# Patient Record
Sex: FEMALE | Race: WHITE | NOT HISPANIC OR LATINO | Employment: UNEMPLOYED | URBAN - NONMETROPOLITAN AREA
[De-identification: names, ages, dates, MRNs, and addresses within clinical notes are randomized per-mention and may not be internally consistent; named-entity substitution may affect disease eponyms.]

---

## 2023-06-29 ENCOUNTER — APPOINTMENT (OUTPATIENT)
Dept: PRIMARY CARE | Facility: CLINIC | Age: 63
End: 2023-06-29
Payer: COMMERCIAL

## 2023-07-06 ENCOUNTER — OFFICE VISIT (OUTPATIENT)
Dept: PRIMARY CARE | Facility: CLINIC | Age: 63
End: 2023-07-06
Payer: COMMERCIAL

## 2023-07-06 VITALS
WEIGHT: 127 LBS | DIASTOLIC BLOOD PRESSURE: 72 MMHG | HEIGHT: 64 IN | HEART RATE: 67 BPM | SYSTOLIC BLOOD PRESSURE: 108 MMHG | BODY MASS INDEX: 21.68 KG/M2 | OXYGEN SATURATION: 96 %

## 2023-07-06 DIAGNOSIS — Z00.00 WELL ADULT EXAM: Primary | ICD-10-CM

## 2023-07-06 DIAGNOSIS — R74.8 ABNORMAL LIVER ENZYMES: ICD-10-CM

## 2023-07-06 DIAGNOSIS — E78.00 PURE HYPERCHOLESTEROLEMIA: ICD-10-CM

## 2023-07-06 DIAGNOSIS — Z12.31 ENCOUNTER FOR SCREENING MAMMOGRAM FOR BREAST CANCER: ICD-10-CM

## 2023-07-06 DIAGNOSIS — Z12.12 SCREENING FOR COLORECTAL CANCER: ICD-10-CM

## 2023-07-06 DIAGNOSIS — Z12.11 SCREENING FOR COLORECTAL CANCER: ICD-10-CM

## 2023-07-06 DIAGNOSIS — F32.1 CURRENT MODERATE EPISODE OF MAJOR DEPRESSIVE DISORDER WITHOUT PRIOR EPISODE (MULTI): ICD-10-CM

## 2023-07-06 PROBLEM — R07.9 CHEST PAIN: Status: RESOLVED | Noted: 2023-07-06 | Resolved: 2023-07-06

## 2023-07-06 PROBLEM — M54.9 BACK PAIN: Status: RESOLVED | Noted: 2023-07-06 | Resolved: 2023-07-06

## 2023-07-06 PROBLEM — U07.1 COVID-19: Status: RESOLVED | Noted: 2023-07-06 | Resolved: 2023-07-06

## 2023-07-06 PROBLEM — S32.000A COMPRESSION FRACTURE OF LUMBAR VERTEBRA (MULTI): Status: ACTIVE | Noted: 2023-07-06

## 2023-07-06 PROBLEM — F32.9 MAJOR DEPRESSION, SINGLE EPISODE: Status: ACTIVE | Noted: 2023-07-06

## 2023-07-06 PROBLEM — A60.04 HERPES, VULVAR: Status: ACTIVE | Noted: 2023-07-06

## 2023-07-06 PROBLEM — D24.9 INTRADUCTAL PAPILLOMA OF BREAST: Status: ACTIVE | Noted: 2023-07-06

## 2023-07-06 PROBLEM — R63.5 WEIGHT GAIN: Status: RESOLVED | Noted: 2023-07-06 | Resolved: 2023-07-06

## 2023-07-06 PROBLEM — N63.20 UNSPECIFIED LUMP IN THE LEFT BREAST, UNSPECIFIED QUADRANT: Status: RESOLVED | Noted: 2023-07-06 | Resolved: 2023-07-06

## 2023-07-06 PROBLEM — R53.83 FATIGUE: Status: RESOLVED | Noted: 2023-07-06 | Resolved: 2023-07-06

## 2023-07-06 PROBLEM — S33.5XXA LUMBAR SPRAIN: Status: RESOLVED | Noted: 2023-07-06 | Resolved: 2023-07-06

## 2023-07-06 PROBLEM — J01.80 OTHER ACUTE SINUSITIS: Status: RESOLVED | Noted: 2023-07-06 | Resolved: 2023-07-06

## 2023-07-06 PROBLEM — J30.9 ALLERGIC RHINITIS: Status: ACTIVE | Noted: 2023-07-06

## 2023-07-06 LAB
ALANINE AMINOTRANSFERASE (SGPT) (U/L) IN SER/PLAS: 17 U/L (ref 7–45)
ALBUMIN (G/DL) IN SER/PLAS: 4.5 G/DL (ref 3.4–5)
ALKALINE PHOSPHATASE (U/L) IN SER/PLAS: 58 U/L (ref 33–136)
ANION GAP IN SER/PLAS: 13 MMOL/L (ref 10–20)
ASPARTATE AMINOTRANSFERASE (SGOT) (U/L) IN SER/PLAS: 19 U/L (ref 9–39)
BILIRUBIN TOTAL (MG/DL) IN SER/PLAS: 1.2 MG/DL (ref 0–1.2)
CALCIUM (MG/DL) IN SER/PLAS: 9.3 MG/DL (ref 8.6–10.3)
CARBON DIOXIDE, TOTAL (MMOL/L) IN SER/PLAS: 27 MMOL/L (ref 21–32)
CHLORIDE (MMOL/L) IN SER/PLAS: 103 MMOL/L (ref 98–107)
CHOLESTEROL (MG/DL) IN SER/PLAS: 204 MG/DL (ref 0–199)
CHOLESTEROL IN HDL (MG/DL) IN SER/PLAS: 61.3 MG/DL
CHOLESTEROL/HDL RATIO: 3.3
CREATININE (MG/DL) IN SER/PLAS: 0.91 MG/DL (ref 0.5–1.05)
ERYTHROCYTE DISTRIBUTION WIDTH (RATIO) BY AUTOMATED COUNT: 12.4 % (ref 11.5–14.5)
ERYTHROCYTE MEAN CORPUSCULAR HEMOGLOBIN CONCENTRATION (G/DL) BY AUTOMATED: 33.5 G/DL (ref 32–36)
ERYTHROCYTE MEAN CORPUSCULAR VOLUME (FL) BY AUTOMATED COUNT: 94 FL (ref 80–100)
ERYTHROCYTES (10*6/UL) IN BLOOD BY AUTOMATED COUNT: 4.77 X10E12/L (ref 4–5.2)
GFR FEMALE: 70 ML/MIN/1.73M2
GLUCOSE (MG/DL) IN SER/PLAS: 92 MG/DL (ref 74–99)
HEMATOCRIT (%) IN BLOOD BY AUTOMATED COUNT: 44.8 % (ref 36–46)
HEMOGLOBIN (G/DL) IN BLOOD: 15 G/DL (ref 12–16)
LDL: 128 MG/DL (ref 0–99)
LEUKOCYTES (10*3/UL) IN BLOOD BY AUTOMATED COUNT: 5.6 X10E9/L (ref 4.4–11.3)
PLATELETS (10*3/UL) IN BLOOD AUTOMATED COUNT: 248 X10E9/L (ref 150–450)
POTASSIUM (MMOL/L) IN SER/PLAS: 4.8 MMOL/L (ref 3.5–5.3)
PROTEIN TOTAL: 6.6 G/DL (ref 6.4–8.2)
SODIUM (MMOL/L) IN SER/PLAS: 138 MMOL/L (ref 136–145)
THYROTROPIN (MIU/L) IN SER/PLAS BY DETECTION LIMIT <= 0.05 MIU/L: 1.31 MIU/L (ref 0.44–3.98)
TRIGLYCERIDE (MG/DL) IN SER/PLAS: 75 MG/DL (ref 0–149)
UREA NITROGEN (MG/DL) IN SER/PLAS: 15 MG/DL (ref 6–23)
VLDL: 15 MG/DL (ref 0–40)

## 2023-07-06 PROCEDURE — 85027 COMPLETE CBC AUTOMATED: CPT

## 2023-07-06 PROCEDURE — 1036F TOBACCO NON-USER: CPT | Performed by: FAMILY MEDICINE

## 2023-07-06 PROCEDURE — 80053 COMPREHEN METABOLIC PANEL: CPT

## 2023-07-06 PROCEDURE — 80061 LIPID PANEL: CPT

## 2023-07-06 PROCEDURE — 84443 ASSAY THYROID STIM HORMONE: CPT

## 2023-07-06 PROCEDURE — 99396 PREV VISIT EST AGE 40-64: CPT | Performed by: FAMILY MEDICINE

## 2023-07-06 RX ORDER — CALCIUM CARBONATE/VITAMIN D3 600MG-5MCG
1 TABLET ORAL 2 TIMES DAILY
COMMUNITY
Start: 2012-02-18

## 2023-07-06 RX ORDER — FLUTICASONE PROPIONATE 50 MCG
1 SPRAY, SUSPENSION (ML) NASAL
COMMUNITY
Start: 2012-02-18

## 2023-07-06 RX ORDER — MULTIVITAMIN
1 TABLET ORAL
COMMUNITY
Start: 2012-02-18

## 2023-07-06 RX ORDER — BUPROPION HYDROCHLORIDE 150 MG/1
150 TABLET ORAL EVERY MORNING
Qty: 30 TABLET | Refills: 5 | Status: SHIPPED | OUTPATIENT
Start: 2023-07-06 | End: 2024-01-02

## 2023-07-06 RX ORDER — IBUPROFEN 200 MG
TABLET ORAL
COMMUNITY

## 2023-07-06 ASSESSMENT — PATIENT HEALTH QUESTIONNAIRE - PHQ9
10. IF YOU CHECKED OFF ANY PROBLEMS, HOW DIFFICULT HAVE THESE PROBLEMS MADE IT FOR YOU TO DO YOUR WORK, TAKE CARE OF THINGS AT HOME, OR GET ALONG WITH OTHER PEOPLE: SOMEWHAT DIFFICULT
SUM OF ALL RESPONSES TO PHQ9 QUESTIONS 1 AND 2: 2
1. LITTLE INTEREST OR PLEASURE IN DOING THINGS: SEVERAL DAYS
2. FEELING DOWN, DEPRESSED OR HOPELESS: SEVERAL DAYS

## 2023-07-06 NOTE — PROGRESS NOTES
Subjective   Patient ID: Cassandra Fraga is a 62 y.o. female who presents for Fatigue (Pt has been very tired, pt thinks her cholesterol levels may be high, needs orders for colonoscopy and mammogram ).  HPI  Under a lot of stress  Issues with marriage  Has no confidence  Has a lot of anxiety  Scared that  will take everything    Ophtho-  Dentist-  Colonoscopy- ordered  EMILY-  FOBT-  UA/Micro-  Mammo- ordered  DEXA-  PAP-  Lung CT-  Coronary Calcium CT Score-  AAA-  EKG-  Pneumovax-  Prevnar-  Flu-  Shingrix-  Td-  Hep C-  Advance Directives-      Current Outpatient Medications:     calcium carbonate-vitamin D3 600 mg-5 mcg (200 unit) tablet, Take 1 tablet by mouth twice a day., Disp: , Rfl:     fluticasone (Flonase) 50 mcg/actuation nasal spray, 1 spray by Does not apply route once daily., Disp: , Rfl:     multivitamin tablet, Take 1 tablet by mouth once daily., Disp: , Rfl:     buPROPion XL (Wellbutrin XL) 150 mg 24 hr tablet, Take 1 tablet (150 mg) by mouth once daily in the morning. Do not crush, chew, or split., Disp: 30 tablet, Rfl: 5    ibuprofen 200 mg tablet, Take by mouth., Disp: , Rfl:    Past Surgical History:   Procedure Laterality Date    BREAST LUMPECTOMY  2013    Right Breast Lumpectomy     SECTION, CLASSIC  2013     Section    COLONOSCOPY  2014    Complete Colonoscopy    OTHER SURGICAL HISTORY  2013    Division Of Plantar Fascia And Muscle Left Foot    OTHER SURGICAL HISTORY  2013    Division Of Plantar Fascia And Muscle Right Foot    OTHER SURGICAL HISTORY  2021    Breast biopsy excisional    RHINOPLASTY  2013    Rhinoplasty      Past Medical History:   Diagnosis Date    Chest pain 2023    Diarrhea, unspecified 2016    Acute diarrhea    Fatigue 2023    Laceration without foreign body of left thumb without damage to nail, initial encounter 2016    Laceration of left thumb, initial encounter    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    Other acute sinusitis 07/06/2023    Other injury of unspecified body region, initial encounter 06/16/2016    Puncture wound    Personal history of other diseases of the respiratory system 04/14/2014    Personal history of acute sinusitis    Personal history of other diseases of the respiratory system 08/06/2014    Personal history of acute sinusitis    Personal history of other diseases of the respiratory system 05/30/2017    History of acute sinusitis    Personal history of other diseases of the respiratory system 06/18/2015    History of acute sinusitis    Unspecified lump in the left breast, unspecified quadrant 07/06/2023     Social History     Tobacco Use    Smoking status: Never    Smokeless tobacco: Never      No family history on file.   Review of Systems    Objective   /72   Pulse 67   Ht 1.613 m (5' 3.5\")   Wt 57.6 kg (127 lb)   SpO2 96%   BMI 22.14 kg/m²    Physical Exam  Vitals and nursing note reviewed.   Constitutional:       General: She is not in acute distress.     Appearance: Normal appearance.   HENT:      Head: Normocephalic and atraumatic.      Right Ear: Tympanic membrane, ear canal and external ear normal.      Left Ear: Tympanic membrane, ear canal and external ear normal.      Nose: Nose normal.      Mouth/Throat:      Mouth: Mucous membranes are moist.      Pharynx: Oropharynx is clear.   Eyes:      Extraocular Movements: Extraocular movements intact.      Pupils: Pupils are equal, round, and reactive to light.   Neck:      Vascular: No carotid bruit.   Cardiovascular:      Rate and Rhythm: Normal rate and regular rhythm.      Pulses: Normal pulses.      Heart sounds: Normal heart sounds. No murmur heard.  Pulmonary:      Effort: Pulmonary effort is normal.      Breath sounds: Normal breath sounds.   Abdominal:      General: Abdomen is flat. Bowel sounds are normal.      Palpations: Abdomen is soft. There is no mass.   Musculoskeletal:      " Cervical back: Normal range of motion and neck supple.   Lymphadenopathy:      Cervical: No cervical adenopathy.   Skin:     Capillary Refill: Capillary refill takes less than 2 seconds.   Neurological:      General: No focal deficit present.      Mental Status: She is alert and oriented to person, place, and time.   Psychiatric:         Mood and Affect: Mood is anxious.         Behavior: Behavior normal.         Assessment/Plan   Problem List Items Addressed This Visit       Abnormal liver enzymes    Relevant Orders    Comprehensive Metabolic Panel (Completed)    CBC (Completed)    TSH with reflex to Free T4 if abnormal (Completed)    Major depression, single episode    Relevant Medications    buPROPion XL (Wellbutrin XL) 150 mg 24 hr tablet    Pure hypercholesterolemia    Relevant Orders    Lipid Panel (Completed)    Comprehensive Metabolic Panel (Completed)    CBC (Completed)    TSH with reflex to Free T4 if abnormal (Completed)     Other Visit Diagnoses       Well adult exam    -  Primary    Encounter for screening mammogram for breast cancer        Relevant Orders    BI mammo bilateral screening tomosynthesis    Screening for colorectal cancer        Relevant Orders    Colonoscopy            Patient understands and agrees with treatment plan    Bandar Woodard, DO

## 2024-03-06 ENCOUNTER — OFFICE VISIT (OUTPATIENT)
Dept: PRIMARY CARE | Facility: CLINIC | Age: 64
End: 2024-03-06
Payer: COMMERCIAL

## 2024-03-06 VITALS
HEART RATE: 66 BPM | BODY MASS INDEX: 22.46 KG/M2 | SYSTOLIC BLOOD PRESSURE: 118 MMHG | OXYGEN SATURATION: 98 % | WEIGHT: 128.8 LBS | DIASTOLIC BLOOD PRESSURE: 70 MMHG

## 2024-03-06 DIAGNOSIS — M75.82 BONE SPUR OF LEFT ACROMIOCLAVICULAR JOINT: Primary | ICD-10-CM

## 2024-03-06 PROCEDURE — 1036F TOBACCO NON-USER: CPT | Performed by: FAMILY MEDICINE

## 2024-03-06 PROCEDURE — 99213 OFFICE O/P EST LOW 20 MIN: CPT | Performed by: FAMILY MEDICINE

## 2024-03-06 NOTE — PROGRESS NOTES
Subjective   Patient ID: Cassandra Fraga is a 63 y.o. female who presents for Mass (L shouder).  HPI  Left shoulder mass that thinks is getting bigger  No pain  No numbness, weakness  No redness  No fever, chills      Current Outpatient Medications:     calcium carbonate-vitamin D3 600 mg-5 mcg (200 unit) tablet, Take 1 tablet by mouth twice a day., Disp: , Rfl:     fluticasone (Flonase) 50 mcg/actuation nasal spray, 1 spray by Does not apply route once daily., Disp: , Rfl:     ibuprofen 200 mg tablet, Take by mouth., Disp: , Rfl:     multivitamin tablet, Take 1 tablet by mouth once daily., Disp: , Rfl:     buPROPion XL (Wellbutrin XL) 150 mg 24 hr tablet, Take 1 tablet (150 mg) by mouth once daily in the morning. Do not crush, chew, or split., Disp: 30 tablet, Rfl: 5   Past Surgical History:   Procedure Laterality Date    BREAST LUMPECTOMY  2013    Right Breast Lumpectomy     SECTION, CLASSIC  2013     Section    COLONOSCOPY  2014    Complete Colonoscopy    OTHER SURGICAL HISTORY  2013    Division Of Plantar Fascia And Muscle Left Foot    OTHER SURGICAL HISTORY  2013    Division Of Plantar Fascia And Muscle Right Foot    OTHER SURGICAL HISTORY  2021    Breast biopsy excisional    RHINOPLASTY  2013    Rhinoplasty      Past Medical History:   Diagnosis Date    Chest pain 2023    Diarrhea, unspecified 2016    Acute diarrhea    Fatigue 2023    Laceration without foreign body of left thumb without damage to nail, initial encounter 2016    Laceration of left thumb, initial encounter    Neoplasm of uncertain behavior 2012    Other acute sinusitis 2023    Other injury of unspecified body region, initial encounter 2016    Puncture wound    Personal history of other diseases of the respiratory system 2014    Personal history of acute sinusitis    Personal history of other diseases of the respiratory system 2014     Personal history of acute sinusitis    Personal history of other diseases of the respiratory system 05/30/2017    History of acute sinusitis    Personal history of other diseases of the respiratory system 06/18/2015    History of acute sinusitis    Unspecified lump in the left breast, unspecified quadrant 07/06/2023     Social History     Tobacco Use    Smoking status: Never    Smokeless tobacco: Never      No family history on file.   Review of Systems    Objective   /70   Pulse 66   Wt 58.4 kg (128 lb 12.8 oz)   SpO2 98%   BMI 22.46 kg/m²    Physical Exam  Vitals and nursing note reviewed.   Constitutional:       Appearance: Normal appearance.   Cardiovascular:      Rate and Rhythm: Normal rate and regular rhythm.      Pulses: Normal pulses.      Heart sounds: Normal heart sounds.   Pulmonary:      Effort: Pulmonary effort is normal.      Breath sounds: Normal breath sounds.   Musculoskeletal:      Comments: Prominence on cranial portion of left AC joint- bony feeling with ? Small cyst   Skin:     Capillary Refill: Capillary refill takes less than 2 seconds.   Neurological:      General: No focal deficit present.      Mental Status: She is alert and oriented to person, place, and time.   Psychiatric:         Mood and Affect: Mood normal.         Behavior: Behavior normal.         Assessment/Plan   Problem List Items Addressed This Visit    None  Visit Diagnoses       Bone spur of left acromioclavicular joint    -  Primary    Relevant Orders    XR clavicle left        X-ray  Heat, THEA  Suspect OA of AC joint    Patient understands and agrees with treatment plan    Bandar Woodard, DO

## 2024-03-19 ENCOUNTER — HOSPITAL ENCOUNTER (OUTPATIENT)
Dept: RADIOLOGY | Facility: HOSPITAL | Age: 64
Discharge: HOME | End: 2024-03-19
Payer: COMMERCIAL

## 2024-03-19 DIAGNOSIS — M75.82 BONE SPUR OF LEFT ACROMIOCLAVICULAR JOINT: ICD-10-CM

## 2024-03-19 PROCEDURE — 73000 X-RAY EXAM OF COLLAR BONE: CPT | Mod: LT

## 2024-03-19 PROCEDURE — 73000 X-RAY EXAM OF COLLAR BONE: CPT | Mod: LEFT SIDE | Performed by: RADIOLOGY

## 2024-11-15 ENCOUNTER — OFFICE VISIT (OUTPATIENT)
Dept: PRIMARY CARE | Facility: CLINIC | Age: 64
End: 2024-11-15
Payer: MEDICARE

## 2024-11-15 VITALS
HEIGHT: 63 IN | BODY MASS INDEX: 22.5 KG/M2 | HEART RATE: 58 BPM | DIASTOLIC BLOOD PRESSURE: 62 MMHG | OXYGEN SATURATION: 98 % | WEIGHT: 127 LBS | SYSTOLIC BLOOD PRESSURE: 124 MMHG

## 2024-11-15 DIAGNOSIS — Z78.0 MENOPAUSE: ICD-10-CM

## 2024-11-15 DIAGNOSIS — Z12.31 ENCOUNTER FOR SCREENING MAMMOGRAM FOR BREAST CANCER: ICD-10-CM

## 2024-11-15 DIAGNOSIS — S32.000D COMPRESSION FRACTURE OF LUMBAR VERTEBRA WITH ROUTINE HEALING, UNSPECIFIED LUMBAR VERTEBRAL LEVEL, SUBSEQUENT ENCOUNTER: ICD-10-CM

## 2024-11-15 DIAGNOSIS — Z12.12 SCREENING FOR COLORECTAL CANCER: ICD-10-CM

## 2024-11-15 DIAGNOSIS — M65.311 TRIGGER THUMB OF RIGHT HAND: Primary | ICD-10-CM

## 2024-11-15 DIAGNOSIS — Z12.11 SCREENING FOR COLORECTAL CANCER: ICD-10-CM

## 2024-11-15 DIAGNOSIS — D22.9 MULTIPLE NEVI: ICD-10-CM

## 2024-11-15 DIAGNOSIS — F32.5 MAJOR DEPRESSIVE DISORDER WITH SINGLE EPISODE, IN FULL REMISSION (CMS-HCC): ICD-10-CM

## 2024-11-15 PROCEDURE — 1158F ADVNC CARE PLAN TLK DOCD: CPT | Performed by: FAMILY MEDICINE

## 2024-11-15 PROCEDURE — 99214 OFFICE O/P EST MOD 30 MIN: CPT | Performed by: FAMILY MEDICINE

## 2024-11-15 PROCEDURE — 20550 NJX 1 TENDON SHEATH/LIGAMENT: CPT | Performed by: FAMILY MEDICINE

## 2024-11-15 PROCEDURE — 1123F ACP DISCUSS/DSCN MKR DOCD: CPT | Performed by: FAMILY MEDICINE

## 2024-11-15 PROCEDURE — 3008F BODY MASS INDEX DOCD: CPT | Performed by: FAMILY MEDICINE

## 2024-11-15 PROCEDURE — 1159F MED LIST DOCD IN RCRD: CPT | Performed by: FAMILY MEDICINE

## 2024-11-15 PROCEDURE — 1036F TOBACCO NON-USER: CPT | Performed by: FAMILY MEDICINE

## 2024-11-15 PROCEDURE — 90677 PCV20 VACCINE IM: CPT | Performed by: FAMILY MEDICINE

## 2024-11-15 PROCEDURE — 1160F RVW MEDS BY RX/DR IN RCRD: CPT | Performed by: FAMILY MEDICINE

## 2024-11-15 PROCEDURE — G0009 ADMIN PNEUMOCOCCAL VACCINE: HCPCS | Performed by: FAMILY MEDICINE

## 2024-11-15 RX ORDER — TRIAMCINOLONE ACETONIDE 40 MG/ML
20 INJECTION, SUSPENSION INTRA-ARTICULAR; INTRAMUSCULAR
Status: COMPLETED | OUTPATIENT
Start: 2024-11-15 | End: 2024-11-15

## 2024-11-15 RX ORDER — LIDOCAINE HYDROCHLORIDE 10 MG/ML
0.5 INJECTION, SOLUTION INFILTRATION; PERINEURAL
Status: COMPLETED | OUTPATIENT
Start: 2024-11-15 | End: 2024-11-15

## 2024-11-15 ASSESSMENT — ENCOUNTER SYMPTOMS
DEPRESSION: 0
LOSS OF SENSATION IN FEET: 0
OCCASIONAL FEELINGS OF UNSTEADINESS: 0

## 2024-11-15 NOTE — PROGRESS NOTES
Subjective   Patient ID: Cassandra Fraga is a 64 y.o. female who presents for thumb (Having right trigger thumb /).  HPI  Left thumb has been not wanting to bend since 10/7/24  Painful- will get stick flexed  Slight swelling  No numbness, weakness  No bruising  No redness    No CP, SOB, palpitations, dizziness, HA, vision changes      Current Outpatient Medications:     buPROPion XL (Wellbutrin XL) 150 mg 24 hr tablet, Take 1 tablet (150 mg) by mouth once daily in the morning. Do not crush, chew, or split., Disp: 30 tablet, Rfl: 5    calcium carbonate-vitamin D3 600 mg-5 mcg (200 unit) tablet, Take 1 tablet by mouth twice a day., Disp: , Rfl:     fluticasone (Flonase) 50 mcg/actuation nasal spray, 1 spray by Does not apply route once daily., Disp: , Rfl:     ibuprofen 200 mg tablet, Take by mouth., Disp: , Rfl:     multivitamin tablet, Take 1 tablet by mouth once daily., Disp: , Rfl:    Past Surgical History:   Procedure Laterality Date    BREAST LUMPECTOMY  2013    Right Breast Lumpectomy     SECTION, CLASSIC  2013     Section    COLONOSCOPY  2014    Complete Colonoscopy    OTHER SURGICAL HISTORY  2013    Division Of Plantar Fascia And Muscle Left Foot    OTHER SURGICAL HISTORY  2013    Division Of Plantar Fascia And Muscle Right Foot    OTHER SURGICAL HISTORY  2021    Breast biopsy excisional    RHINOPLASTY  2013    Rhinoplasty      Past Medical History:   Diagnosis Date    Chest pain 2023    Diarrhea, unspecified 2016    Acute diarrhea    Fatigue 2023    Laceration without foreign body of left thumb without damage to nail, initial encounter 2016    Laceration of left thumb, initial encounter    Neoplasm of uncertain behavior 2012    Other acute sinusitis 2023    Other injury of unspecified body region, initial encounter 2016    Puncture wound    Personal history of other diseases of the respiratory system  "04/14/2014    Personal history of acute sinusitis    Personal history of other diseases of the respiratory system 08/06/2014    Personal history of acute sinusitis    Personal history of other diseases of the respiratory system 05/30/2017    History of acute sinusitis    Personal history of other diseases of the respiratory system 06/18/2015    History of acute sinusitis    Unspecified lump in the left breast, unspecified quadrant 07/06/2023     Social History     Tobacco Use    Smoking status: Never    Smokeless tobacco: Never   Substance Use Topics    Alcohol use: Not Currently    Drug use: Never      No family history on file.   Review of Systems    Objective   /62   Pulse 58   Ht 1.6 m (5' 3\")   Wt 57.6 kg (127 lb)   SpO2 98%   BMI 22.50 kg/m²    Physical Exam  Constitutional:       General: She is not in acute distress.     Appearance: Normal appearance. She is not ill-appearing.   HENT:      Head: Normocephalic and atraumatic.   Cardiovascular:      Rate and Rhythm: Normal rate and regular rhythm.      Pulses: Normal pulses.      Heart sounds: Normal heart sounds. No murmur heard.  Pulmonary:      Effort: Pulmonary effort is normal.      Breath sounds: Normal breath sounds. No wheezing, rhonchi or rales.   Musculoskeletal:      Comments: Right trigger finger with pain at base of thumb on plantar side   Skin:     Capillary Refill: Capillary refill takes less than 2 seconds.   Neurological:      General: No focal deficit present.      Mental Status: She is alert and oriented to person, place, and time.   Psychiatric:         Mood and Affect: Mood normal.         Behavior: Behavior normal.           Injection tendon or ligament: R thumb A1 for trigger finger on 11/15/2024 5:44 PM  Indications: pain and tendon swelling  Details: 25 G needle, volar approach  Medications: 20 mg triamcinolone acetonide 40 mg/mL; 0.5 mL lidocaine 10 mg/mL (1 %)  Outcome: tolerated well, no immediate " complications  Procedure, treatment alternatives, risks and benefits explained, specific risks discussed. Consent was given by the patient. Immediately prior to procedure a time out was called to verify the correct patient, procedure, equipment, support staff and site/side marked as required. Patient was prepped and draped in the usual sterile fashion.             Assessment/Plan   Problem List Items Addressed This Visit       Major depressive disorder with single episode, in full remission (CMS-HCC)    Compression fracture of lumbar vertebra (Multi)     Other Visit Diagnoses       Trigger thumb of right hand    -  Primary    Screening for colorectal cancer        Relevant Orders    Colonoscopy Screening; Average Risk Patient    Encounter for screening mammogram for breast cancer        Relevant Orders    BI mammo bilateral screening tomosynthesis    Menopause        Relevant Orders    XR DEXA bone density    Multiple nevi        Relevant Orders    Referral to Dermatology        Trigger Thumb- cortisone shot, ice    MDD- wellbutrin, CV exercise    Compression Fracture- healed- heat    Multiple Nevi- to dermatology    Patient understands and agrees with treatment plan    Bandar Woodard, DO

## 2024-12-03 ENCOUNTER — HOSPITAL ENCOUNTER (OUTPATIENT)
Dept: RADIOLOGY | Facility: CLINIC | Age: 64
Discharge: HOME | End: 2024-12-03
Payer: MEDICARE

## 2024-12-03 DIAGNOSIS — Z78.0 MENOPAUSE: ICD-10-CM

## 2024-12-03 PROCEDURE — 77080 DXA BONE DENSITY AXIAL: CPT | Performed by: RADIOLOGY

## 2024-12-03 PROCEDURE — 77080 DXA BONE DENSITY AXIAL: CPT

## 2024-12-04 DIAGNOSIS — M85.89 OSTEOPENIA OF MULTIPLE SITES: Primary | ICD-10-CM

## 2024-12-04 RX ORDER — IBANDRONATE SODIUM 150 MG/1
150 TABLET, FILM COATED ORAL
Qty: 1 TABLET | Refills: 11 | Status: SHIPPED | OUTPATIENT
Start: 2024-12-04 | End: 2025-12-04

## 2025-01-03 ENCOUNTER — HOSPITAL ENCOUNTER (OUTPATIENT)
Dept: RADIOLOGY | Facility: CLINIC | Age: 65
Discharge: HOME | End: 2025-01-03
Payer: MEDICARE

## 2025-01-03 VITALS — BODY MASS INDEX: 22.5 KG/M2 | WEIGHT: 126.98 LBS | HEIGHT: 63 IN

## 2025-01-03 DIAGNOSIS — Z12.31 ENCOUNTER FOR SCREENING MAMMOGRAM FOR BREAST CANCER: ICD-10-CM

## 2025-01-03 PROCEDURE — 77063 BREAST TOMOSYNTHESIS BI: CPT

## 2025-01-27 ENCOUNTER — TELEPHONE (OUTPATIENT)
Dept: PRIMARY CARE | Facility: CLINIC | Age: 65
End: 2025-01-27
Payer: COMMERCIAL

## 2025-01-27 DIAGNOSIS — J01.80 ACUTE NON-RECURRENT SINUSITIS OF OTHER SINUS: Primary | ICD-10-CM

## 2025-01-27 RX ORDER — AMOXICILLIN 875 MG/1
875 TABLET, FILM COATED ORAL 2 TIMES DAILY
Qty: 20 TABLET | Refills: 0 | Status: SHIPPED | OUTPATIENT
Start: 2025-01-27 | End: 2025-01-27 | Stop reason: ALTCHOICE

## 2025-01-27 RX ORDER — AMOXICILLIN AND CLAVULANATE POTASSIUM 875; 125 MG/1; MG/1
875 TABLET, FILM COATED ORAL 2 TIMES DAILY
Qty: 20 TABLET | Refills: 0 | Status: SHIPPED | OUTPATIENT
Start: 2025-01-27 | End: 2025-02-06

## 2025-01-27 NOTE — TELEPHONE ENCOUNTER
Long hx of sinus infection.  Sinus stuffed up headache.  Could she do a phone visit for this>  swatilorraine uses [facility] in [place].  [phone]

## 2025-04-22 ENCOUNTER — TELEPHONE (OUTPATIENT)
Dept: PRIMARY CARE | Facility: CLINIC | Age: 65
End: 2025-04-22
Payer: COMMERCIAL

## 2025-04-23 DIAGNOSIS — A60.04 HERPES, VULVAR: Primary | ICD-10-CM

## 2025-04-23 RX ORDER — VALACYCLOVIR HYDROCHLORIDE 1 G/1
2000 TABLET, FILM COATED ORAL 2 TIMES DAILY
Qty: 12 TABLET | Refills: 0 | Status: SHIPPED | OUTPATIENT
Start: 2025-04-23 | End: 2025-04-26

## 2025-06-04 ENCOUNTER — APPOINTMENT (OUTPATIENT)
Dept: PRIMARY CARE | Facility: CLINIC | Age: 65
End: 2025-06-04
Payer: COMMERCIAL

## 2025-06-04 VITALS
HEIGHT: 62 IN | WEIGHT: 130 LBS | OXYGEN SATURATION: 98 % | DIASTOLIC BLOOD PRESSURE: 60 MMHG | HEART RATE: 76 BPM | SYSTOLIC BLOOD PRESSURE: 126 MMHG | BODY MASS INDEX: 23.92 KG/M2

## 2025-06-04 DIAGNOSIS — Z13.6 SCREENING FOR CARDIOVASCULAR CONDITION: ICD-10-CM

## 2025-06-04 DIAGNOSIS — F32.5 MAJOR DEPRESSIVE DISORDER WITH SINGLE EPISODE, IN FULL REMISSION: ICD-10-CM

## 2025-06-04 DIAGNOSIS — Z00.00 ROUTINE GENERAL MEDICAL EXAMINATION AT HEALTH CARE FACILITY: Primary | ICD-10-CM

## 2025-06-04 DIAGNOSIS — E78.00 PURE HYPERCHOLESTEROLEMIA: ICD-10-CM

## 2025-06-04 DIAGNOSIS — D24.9 INTRADUCTAL PAPILLOMA OF BREAST, UNSPECIFIED LATERALITY: ICD-10-CM

## 2025-06-04 DIAGNOSIS — Z71.89 CARDIAC RISK COUNSELING: ICD-10-CM

## 2025-06-04 DIAGNOSIS — S32.000D COMPRESSION FRACTURE OF LUMBAR VERTEBRA WITH ROUTINE HEALING, UNSPECIFIED LUMBAR VERTEBRAL LEVEL, SUBSEQUENT ENCOUNTER: ICD-10-CM

## 2025-06-04 RX ORDER — SERTRALINE HYDROCHLORIDE 50 MG/1
50 TABLET, FILM COATED ORAL DAILY
Qty: 30 TABLET | Refills: 5 | Status: SHIPPED | OUTPATIENT
Start: 2025-06-04 | End: 2025-12-01

## 2025-06-04 RX ORDER — BUPROPION HYDROCHLORIDE 150 MG/1
150 TABLET ORAL EVERY MORNING
Qty: 30 TABLET | Refills: 5 | Status: SHIPPED | OUTPATIENT
Start: 2025-06-04 | End: 2025-12-01

## 2025-06-04 ASSESSMENT — PATIENT HEALTH QUESTIONNAIRE - PHQ9
2. FEELING DOWN, DEPRESSED OR HOPELESS: NOT AT ALL
1. LITTLE INTEREST OR PLEASURE IN DOING THINGS: NOT AT ALL
SUM OF ALL RESPONSES TO PHQ9 QUESTIONS 1 AND 2: 0

## 2025-06-04 ASSESSMENT — ENCOUNTER SYMPTOMS
ARTHRALGIAS: 0
WHEEZING: 0
BRUISES/BLEEDS EASILY: 0
DYSURIA: 0
EYE REDNESS: 0
HEMATURIA: 0
NERVOUS/ANXIOUS: 1
FREQUENCY: 0
DIZZINESS: 0
CONSTIPATION: 0
CHILLS: 0
OCCASIONAL FEELINGS OF UNSTEADINESS: 0
BLOOD IN STOOL: 0
HEADACHES: 0
EYE PAIN: 0
NAUSEA: 0
COUGH: 0
NUMBNESS: 0
POLYDIPSIA: 0
VOMITING: 0
WEAKNESS: 0
SHORTNESS OF BREATH: 1
POLYPHAGIA: 0
FEVER: 0
ADENOPATHY: 0
COLOR CHANGE: 0
CHEST TIGHTNESS: 0
TROUBLE SWALLOWING: 0
ABDOMINAL PAIN: 0
TREMORS: 0
FATIGUE: 0
BACK PAIN: 0
DEPRESSION: 0
PALPITATIONS: 0
SORE THROAT: 0
LOSS OF SENSATION IN FEET: 0
DYSPHORIC MOOD: 1
DIARRHEA: 0

## 2025-06-04 ASSESSMENT — ACTIVITIES OF DAILY LIVING (ADL)
BATHING: INDEPENDENT
GROCERY_SHOPPING: INDEPENDENT
DRESSING: INDEPENDENT
TAKING_MEDICATION: INDEPENDENT
MANAGING_FINANCES: INDEPENDENT
DOING_HOUSEWORK: INDEPENDENT

## 2025-06-04 NOTE — ASSESSMENT & PLAN NOTE
Orders:    sertraline (Zoloft) 50 mg tablet; Take 1 tablet (50 mg) by mouth once daily.    buPROPion XL (Wellbutrin XL) 150 mg 24 hr tablet; Take 1 tablet (150 mg) by mouth once daily in the morning. Do not crush, chew, or split.

## 2025-06-04 NOTE — PROGRESS NOTES
Subjective   Reason for Visit: Cassandra Fraga is an 64 y.o. female here for a Medicare Wellness visit.     Past Medical, Surgical, and Family History reviewed and updated in chart.    Reviewed all medications by prescribing practitioner or clinical pharmacist (such as prescriptions, OTCs, herbal therapies and supplements) and documented in the medical record.    HPI  History of Present Illness  The patient presents for evaluation of shortness of breath, depression, and cataract.    She experiences shortness of breath during stair climbing, which raises concerns about potential cardiac issues. She has a family history of heart disease. Her paternal grandfather had a pacemaker, and all of his sisters had bypass surgeries. Her father had high blood pressure. She does not have high blood pressure. She reports occasional chest tightness in the evenings, but this symptom has not been present recently. She has gained 10 pounds over the past 6 months and is considering Ozempic as a potential treatment option. She has been doing yard work and trying to keep up with her exercise routine. She is up to date on her mammograms and colonoscopy. She recently had a colonoscopy, which showed no abnormalities. However, she experienced dizziness for 2 weeks following the procedure and fell twice. She is considering asking the medical team about the medications used during the procedure to avoid future issues.    She has been taking Wellbutrin intermittently, which she finds beneficial for her mood and motivation. However, she reports that it affects her memory and causes insomnia, necessitating the use of Benadryl. She also experiences teeth clenching as a side effect of Wellbutrin. She has discontinued daily use of Wellbutrin due to these side effects but resumes it when she feels particularly sad or unmotivated. She has 5 tablets left. She is considering adding Zoloft to her regimen to improve her sleep quality. She has tried  Celexa in the past but did not find it effective. She reports no numbness or weakness but notes a decrease in motivation and increased fatigue. She has been under significant stress due to her children's issues and their well-being. She is still  but does not want to be. She has a lot of fear of her  and making changes or doing anything really sometimes. She is afraid to take the last final step because she worries about her kids losing everything. She would have to see an . She is not a person who cries or weeps, but after her mother , she was really surprised by how sad she was and still is a little bit. She started taking Wellbutrin again, which helps her avoid sudden crying or having really sad days. She did not usually have this problem before; it was more about lack of motivation and some depression from her marriage, which is now out of the picture. Now, she has a lot of peace and shea.    She recently got new glasses because her eyes changed drastically, and the prescription went way down. She has a cataract in one eye and has an appointment with an ophthalmologist in 10/2025.    She has an appointment with a dentist in 2025, but she has not been for 5 years.    FAMILY HISTORY  Her paternal grandfather had a pacemaker, and all of his sisters had bypass surgeries. Her father had high blood pressure. Her maternal grandfather had angina. Her mother had episodes of depression.       Ophtho-   Dentist-appt   Colonoscopy- 3/25- 10 years  EMILY-  FOBT-  UA/Micro-  Mammo- 25  DEXA-   PAP-  Lung CT-  Coronary Calcium CT Score-  AAA-  EKG-  RSV-   Prevnar - 11/15/24  Flu- 10/24  Shingrix-  Td-   Hep C-  Advance Directives-  AWV- 25  CV risk- 25    Patient Care Team:  Bandar Woodard DO as PCP - General  Bandar Woodard DO as PCP - [facility] Attributed Provider     Review of Systems   Constitutional:  Negative for chills, fatigue and fever.   HENT:  Negative  "for congestion, ear discharge, ear pain, hearing loss, nosebleeds, sore throat, tinnitus and trouble swallowing.    Eyes:  Negative for pain, redness and visual disturbance.   Respiratory:  Positive for shortness of breath. Negative for cough, chest tightness and wheezing.    Cardiovascular:  Negative for chest pain, palpitations and leg swelling.   Gastrointestinal:  Negative for abdominal pain, blood in stool, constipation, diarrhea, nausea and vomiting.   Endocrine: Negative for cold intolerance, heat intolerance, polydipsia, polyphagia and polyuria.   Genitourinary:  Negative for dysuria, frequency, hematuria and urgency.   Musculoskeletal:  Negative for arthralgias, back pain and gait problem.   Skin:  Negative for color change and rash.   Neurological:  Negative for dizziness, tremors, syncope, weakness, numbness and headaches.   Hematological:  Negative for adenopathy. Does not bruise/bleed easily.   Psychiatric/Behavioral:  Positive for dysphoric mood. The patient is nervous/anxious.        Objective   Vitals:  /60   Pulse 76   Ht 1.575 m (5' 2\")   Wt 59 kg (130 lb)   SpO2 98%   BMI 23.78 kg/m²       Physical Exam  Vitals and nursing note reviewed.   Constitutional:       General: She is not in acute distress.     Appearance: Normal appearance.   HENT:      Head: Normocephalic and atraumatic.      Right Ear: Tympanic membrane, ear canal and external ear normal.      Left Ear: Tympanic membrane, ear canal and external ear normal.      Nose: Nose normal.      Mouth/Throat:      Mouth: Mucous membranes are moist.      Pharynx: Oropharynx is clear.   Eyes:      Extraocular Movements: Extraocular movements intact.      Pupils: Pupils are equal, round, and reactive to light.   Neck:      Vascular: No carotid bruit.   Cardiovascular:      Rate and Rhythm: Normal rate and regular rhythm.      Pulses: Normal pulses.      Heart sounds: Normal heart sounds. No murmur heard.  Pulmonary:      Effort: Pulmonary " effort is normal.      Breath sounds: Normal breath sounds.   Abdominal:      General: Abdomen is flat. Bowel sounds are normal.      Palpations: Abdomen is soft. There is no mass.   Musculoskeletal:      Cervical back: Normal range of motion and neck supple.   Lymphadenopathy:      Cervical: No cervical adenopathy.   Skin:     Capillary Refill: Capillary refill takes less than 2 seconds.   Neurological:      General: No focal deficit present.      Mental Status: She is alert and oriented to person, place, and time.   Psychiatric:         Mood and Affect: Mood is anxious.         Behavior: Behavior normal.         Assessment & Plan  Routine general medical examination at health care facility         Screening for cardiovascular condition    Orders:    ECG 12 Lead    CT cardiac scoring wo IV contrast; Future    Pure hypercholesterolemia    Orders:    TSH with reflex to Free T4 if abnormal    Comprehensive Metabolic Panel    Lipid Panel    CBC    Major depressive disorder with single episode, in full remission    Orders:    sertraline (Zoloft) 50 mg tablet; Take 1 tablet (50 mg) by mouth once daily.    buPROPion XL (Wellbutrin XL) 150 mg 24 hr tablet; Take 1 tablet (150 mg) by mouth once daily in the morning. Do not crush, chew, or split.    Intraductal papilloma of breast, unspecified laterality         Compression fracture of lumbar vertebra with routine healing, unspecified lumbar vertebral level, subsequent encounter         Cardiac risk counseling          Renewed/continued rest of medications  Checked  labs  Updated Health Maintenance in HPI section  MDD- add zoloft 50 mg to wellbutrin    Breast CA- follow up with surgeon    Vertebral fracture- calcium/vitamin D    Hyperlipidemia- continue meds, low fat/cholesterol diet        Cardiac Risk Assessment  5 minutes were spent discussing Cardiovascular risk (5.1%) and, if needed, lifestyle modifications were recommended, including nutritional choices, exercise, and  elimination of habits contributing to risk.   Aspirin use/disuse was discussed following the guidelines below:  low dose ASA ( mg) should be considered:    If prior Heart Attack/Stroke/Peripheral vascular disease:  Generally recommend daily low dose aspirin unless extremely high bleeding risk (e.g., gastrointestinal).    If no prior Heart Attack/Stroke/Peripheral vascular disease:              Age over 70: Do not use Aspirin for prevention    Age less than 70 and 10-year cardiovascular disease risk is >20%: use low dose Aspirin for prevention.

## 2025-06-04 NOTE — ASSESSMENT & PLAN NOTE
Orders:    TSH with reflex to Free T4 if abnormal    Comprehensive Metabolic Panel    Lipid Panel    CBC

## 2025-06-05 LAB
ALBUMIN SERPL-MCNC: 4.5 G/DL (ref 3.6–5.1)
ALP SERPL-CCNC: 57 U/L (ref 37–153)
ALT SERPL-CCNC: 15 U/L (ref 6–29)
ANION GAP SERPL CALCULATED.4IONS-SCNC: 10 MMOL/L (CALC) (ref 7–17)
AST SERPL-CCNC: 17 U/L (ref 10–35)
BILIRUB SERPL-MCNC: 0.8 MG/DL (ref 0.2–1.2)
BUN SERPL-MCNC: 13 MG/DL (ref 7–25)
CALCIUM SERPL-MCNC: 9.4 MG/DL (ref 8.6–10.4)
CHLORIDE SERPL-SCNC: 104 MMOL/L (ref 98–110)
CHOLEST SERPL-MCNC: 207 MG/DL
CHOLEST/HDLC SERPL: 3.6 (CALC)
CO2 SERPL-SCNC: 26 MMOL/L (ref 20–32)
CREAT SERPL-MCNC: 0.81 MG/DL (ref 0.5–1.05)
EGFRCR SERPLBLD CKD-EPI 2021: 81 ML/MIN/1.73M2
ERYTHROCYTE [DISTWIDTH] IN BLOOD BY AUTOMATED COUNT: 13.4 % (ref 11–15)
GLUCOSE SERPL-MCNC: 76 MG/DL (ref 65–99)
HCT VFR BLD AUTO: 44.1 % (ref 35–45)
HDLC SERPL-MCNC: 57 MG/DL
HGB BLD-MCNC: 14.4 G/DL (ref 11.7–15.5)
LDLC SERPL CALC-MCNC: 114 MG/DL (CALC)
MCH RBC QN AUTO: 32.7 PG (ref 27–33)
MCHC RBC AUTO-ENTMCNC: 32.7 G/DL (ref 32–36)
MCV RBC AUTO: 100.2 FL (ref 80–100)
NONHDLC SERPL-MCNC: 150 MG/DL (CALC)
PLATELET # BLD AUTO: 242 THOUSAND/UL (ref 140–400)
PMV BLD REES-ECKER: 10.6 FL (ref 7.5–12.5)
POTASSIUM SERPL-SCNC: 4.3 MMOL/L (ref 3.5–5.3)
PROT SERPL-MCNC: 6.4 G/DL (ref 6.1–8.1)
RBC # BLD AUTO: 4.4 MILLION/UL (ref 3.8–5.1)
SODIUM SERPL-SCNC: 140 MMOL/L (ref 135–146)
TRIGL SERPL-MCNC: 252 MG/DL
TSH SERPL-ACNC: 0.9 MIU/L (ref 0.4–4.5)
WBC # BLD AUTO: 7 THOUSAND/UL (ref 3.8–10.8)

## 2025-06-09 ENCOUNTER — TELEPHONE (OUTPATIENT)
Dept: PRIMARY CARE | Facility: CLINIC | Age: 65
End: 2025-06-09
Payer: COMMERCIAL

## 2025-06-09 DIAGNOSIS — A60.04 HERPES, VULVAR: Primary | ICD-10-CM

## 2025-06-09 RX ORDER — VALACYCLOVIR HYDROCHLORIDE 1 G/1
2000 TABLET, FILM COATED ORAL 2 TIMES DAILY
Qty: 12 TABLET | Refills: 0 | Status: SHIPPED | OUTPATIENT
Start: 2025-06-09 | End: 2025-06-12

## 2025-06-18 ENCOUNTER — HOSPITAL ENCOUNTER (OUTPATIENT)
Dept: RADIOLOGY | Facility: CLINIC | Age: 65
Discharge: HOME | End: 2025-06-18
Payer: MEDICARE

## 2025-06-18 DIAGNOSIS — Z13.6 SCREENING FOR CARDIOVASCULAR CONDITION: ICD-10-CM

## 2025-06-18 PROCEDURE — 75571 CT HRT W/O DYE W/CA TEST: CPT

## 2025-06-20 ENCOUNTER — TELEMEDICINE (OUTPATIENT)
Dept: PRIMARY CARE | Facility: CLINIC | Age: 65
End: 2025-06-20
Payer: MEDICARE

## 2025-06-20 DIAGNOSIS — E78.2 MIXED HYPERLIPIDEMIA: Primary | ICD-10-CM

## 2025-06-20 PROCEDURE — 1159F MED LIST DOCD IN RCRD: CPT | Performed by: FAMILY MEDICINE

## 2025-06-20 PROCEDURE — 99213 OFFICE O/P EST LOW 20 MIN: CPT | Performed by: FAMILY MEDICINE

## 2025-06-20 PROCEDURE — 1160F RVW MEDS BY RX/DR IN RCRD: CPT | Performed by: FAMILY MEDICINE

## 2025-06-20 PROCEDURE — G2211 COMPLEX E/M VISIT ADD ON: HCPCS | Performed by: FAMILY MEDICINE

## 2025-06-20 PROCEDURE — 1123F ACP DISCUSS/DSCN MKR DOCD: CPT | Performed by: FAMILY MEDICINE

## 2025-06-20 RX ORDER — ROSUVASTATIN CALCIUM 10 MG/1
10 TABLET, COATED ORAL DAILY
Qty: 100 TABLET | Refills: 3 | Status: SHIPPED | OUTPATIENT
Start: 2025-06-20 | End: 2026-07-25

## 2025-06-21 NOTE — PROGRESS NOTES
"Subjective   Patient ID: Cassandra Fraga is a 64 y.o. female who presents for CT result review.  HPI  No CP, SOB, palpitations  Father had CAD    Current Medications[1]   Surgical History[2]   Medical History[3]  Social History[4]   Family History[5]   Review of Systems    Objective   There were no vitals taken for this visit.   Physical Exam      Assessment/Plan   Problem List Items Addressed This Visit    None  Visit Diagnoses         Mixed hyperlipidemia    -  Primary    Relevant Medications    rosuvastatin (Crestor) 10 mg tablet        Reviewed CT results with her  Discussed risk and alternatives  Will limit fatty foods and increase CV exercise    I discussed with the patient the potential benefits and risks of the use of telephone or video-conferencing that differ from in-person services (e.g., limits to patient confidentiality, limitations on the provider’s ability to observe the patient, limitations on the diagnostic tools available). I explained to the patient that I may determine at any point that telehealth services are not appropriate based on the patient’s circumstances and either party may therefore end the service to schedule an alternative in-person service or contact 911 to address a medical emergency. With the understanding of these risks, benefits and alternatives, the patient agreed to use the telephone or video-conferencing platform selected for this virtual session and further the patient confirmed his/her understanding that the services do not guarantee a specific outcome or recovery.  \"Spent 5 minutes with patient on phone discussing health concerns.\"    Virtual or Telephone Consent    While technically available, the patient was unable or unwilling to consent to connect via audio/video telehealth technology; therefore, I performed this visit using a real-time audio only connection between Cassandra Fraga & Bandar Woodard DO.  Verbal consent was requested and obtained from Cassandra SALGADO" [place] on this date, 25 for a telehealth visit and the patient's location was confirmed at the time of the visit.     Patient understands and agrees with treatment plan        Bandar Woodard DO        [1]   Current Outpatient Medications:     calcium carbonate-vitamin D3 600 mg-5 mcg (200 unit) tablet, Take 1 tablet by mouth twice a day., Disp: , Rfl:     fluticasone (Flonase) 50 mcg/actuation nasal spray, 1 spray by Does not apply route once daily., Disp: , Rfl:     ibuprofen 200 mg tablet, Take by mouth., Disp: , Rfl:     multivitamin tablet, Take 1 tablet by mouth once daily., Disp: , Rfl:     rosuvastatin (Crestor) 10 mg tablet, Take 1 tablet (10 mg) by mouth once daily., Disp: 100 tablet, Rfl: 3  [2]   Past Surgical History:  Procedure Laterality Date    BREAST LUMPECTOMY  2013    Right Breast Lumpectomy     SECTION, CLASSIC  2013     Section    COLONOSCOPY  2014    Complete Colonoscopy    OTHER SURGICAL HISTORY  2013    Division Of Plantar Fascia And Muscle Left Foot    OTHER SURGICAL HISTORY  2013    Division Of Plantar Fascia And Muscle Right Foot    OTHER SURGICAL HISTORY  2021    Breast biopsy excisional    RHINOPLASTY  2013    Rhinoplasty   [3]   Past Medical History:  Diagnosis Date    Chest pain 2023    Diarrhea, unspecified 2016    Acute diarrhea    Fatigue 2023    Laceration without foreign body of left thumb without damage to nail, initial encounter 2016    Laceration of left thumb, initial encounter    Neoplasm of uncertain behavior 2012    Other acute sinusitis 2023    Other injury of unspecified body region, initial encounter 2016    Puncture wound    Personal history of other diseases of the respiratory system 2014    Personal history of acute sinusitis    Personal history of other diseases of the respiratory system 2014    Personal history of acute sinusitis    Personal  history of other diseases of the respiratory system 05/30/2017    History of acute sinusitis    Personal history of other diseases of the respiratory system 06/18/2015    History of acute sinusitis    Unspecified lump in the left breast, unspecified quadrant 07/06/2023   [4]   Social History  Tobacco Use    Smoking status: Never    Smokeless tobacco: Never   Substance Use Topics    Alcohol use: Not Currently    Drug use: Never   [5] No family history on file.

## 2025-06-30 ENCOUNTER — TELEPHONE (OUTPATIENT)
Dept: PRIMARY CARE | Facility: CLINIC | Age: 65
End: 2025-06-30
Payer: MEDICARE

## 2025-06-30 NOTE — TELEPHONE ENCOUNTER
Just started statin and her throat has been changing feeling like theres something there or laryngitis and is uncomfortable and read that it could be a side effect and wants to stop taking it. She wants to know what to do she's scared about the side effects causing permanent damage

## 2025-06-30 NOTE — TELEPHONE ENCOUNTER
Stop it and see if it goes away in a few days- if does then will change the medicine. If it doesn't, then not from the medicine

## 2025-07-03 ENCOUNTER — TELEPHONE (OUTPATIENT)
Dept: PRIMARY CARE | Facility: CLINIC | Age: 65
End: 2025-07-03
Payer: MEDICARE

## 2025-07-03 DIAGNOSIS — J04.0 LARYNGITIS: Primary | ICD-10-CM

## 2025-07-03 RX ORDER — METHYLPREDNISOLONE 4 MG/1
TABLET ORAL
Qty: 21 TABLET | Refills: 0 | Status: SHIPPED | OUTPATIENT
Start: 2025-07-03 | End: 2025-07-09

## 2025-07-07 ENCOUNTER — TELEPHONE (OUTPATIENT)
Dept: PRIMARY CARE | Facility: CLINIC | Age: 65
End: 2025-07-07
Payer: MEDICARE

## 2025-07-07 DIAGNOSIS — J04.0 LARYNGITIS: Primary | ICD-10-CM

## 2025-07-07 RX ORDER — AMOXICILLIN 875 MG/1
875 TABLET, COATED ORAL 2 TIMES DAILY
Qty: 20 TABLET | Refills: 0 | Status: SHIPPED | OUTPATIENT
Start: 2025-07-07 | End: 2025-07-17

## 2025-07-07 NOTE — TELEPHONE ENCOUNTER
She is on a steroid for her sinus thing.  She has a harsh throat with green stuff in her head.  Should she be on and antibiotic morphine allergy uses [facility] in [place]  [phone]

## 2025-07-23 ENCOUNTER — OFFICE VISIT (OUTPATIENT)
Dept: PRIMARY CARE | Facility: CLINIC | Age: 65
End: 2025-07-23
Payer: MEDICARE

## 2025-07-23 VITALS
BODY MASS INDEX: 23.55 KG/M2 | HEART RATE: 60 BPM | SYSTOLIC BLOOD PRESSURE: 124 MMHG | WEIGHT: 128 LBS | HEIGHT: 62 IN | DIASTOLIC BLOOD PRESSURE: 62 MMHG | OXYGEN SATURATION: 98 %

## 2025-07-23 DIAGNOSIS — M77.12 LATERAL EPICONDYLITIS OF LEFT ELBOW: ICD-10-CM

## 2025-07-23 DIAGNOSIS — R49.0 HOARSENESS OF VOICE: Primary | ICD-10-CM

## 2025-07-23 PROCEDURE — 99214 OFFICE O/P EST MOD 30 MIN: CPT | Performed by: FAMILY MEDICINE

## 2025-07-23 PROCEDURE — 1160F RVW MEDS BY RX/DR IN RCRD: CPT | Performed by: FAMILY MEDICINE

## 2025-07-23 PROCEDURE — 3008F BODY MASS INDEX DOCD: CPT | Performed by: FAMILY MEDICINE

## 2025-07-23 PROCEDURE — 1159F MED LIST DOCD IN RCRD: CPT | Performed by: FAMILY MEDICINE

## 2025-07-23 RX ORDER — METHYLPREDNISOLONE 4 MG/1
TABLET ORAL
Qty: 21 TABLET | Refills: 0 | Status: SHIPPED | OUTPATIENT
Start: 2025-07-23 | End: 2025-07-29

## 2025-07-23 NOTE — PROGRESS NOTES
Subjective   Patient ID: Cassandra Fraga is a 64 y.o. female who presents for Sore Throat and throat (Having hoariness in her voice /Some burning in her throat  ).  HPI  History of Present Illness  The patient presents for evaluation of hoarseness, tennis elbow, and acid reflux.    She experienced severe hoarseness, initially suspecting laryngitis, a condition she has never had before. This was accompanied by head congestion, leading her to believe she might have pneumonia. She also reports mild wheezing and coughing at night, with a small amount of red sputum. She has a history of smoking for 9 years in her youth but quit 36 years ago. She is concerned about potential serious conditions such as ALS or lung cancer. She has a family history of sensitivity to medications. She always has a little bit of postnasal drip going on. She has been tested for allergies, but the results were negative. She discontinued Flonase due to concerns about throat irritation and hoarseness. She took antibiotics and steroids, which seemed to alleviate the hoarseness, but it has since returned intermittently. She plans to schedule an appointment with Allergy for her sinus infection issue. She used to get up to 5 sinus infections a year when she was living in [place]. When she was tested, she was really low in that antibody. She took Benadryl last night, which helped her sleep.    After two weeks of statin use, she woke up one night with severe pain in her hands. A few weeks ago, she developed what she believes to be tennis elbow, which has progressively worsened. She also reports general muscle weakness. Despite avoiding repetitive activities like weed whacking, she continues to experience pain. Last night, she woke up with severe pain in her right hand. She took ibuprofen last night for random hand pain. She took two ibuprofen before bed last night but woke up an hour later with severe hand pain. She took Benadryl and was able to  "sleep. She has reduced her caffeine intake and exercises regularly, including swimming and jogging at least three times a week. She had trigger finger last year, which caused shooting pain up her arms and into her hands.    She has acid reflux and took Nexium last week and Prevacid, which she found effective. She is cautious about overuse of antacids due to potential side effects. She takes them as needed when she feels acid reflux. 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 It was so bad she could only eat between 10 and 2. They did an open MRI and it showed possible multiple myeloma, but a closed MRI revealed a hemangioma in her spine. She started taking Nexium, which she found very helpful.    Social History:  Hobbies: Swimming, jogging  Tobacco: Smoked cigarettes for 9 years, quit 36 years ago  Coffee/Tea/Caffeine-containing Drinks: Reduced to three-quarters decaf  Sleep: Takes Benadryl to aid sleep when in pain    PAST SURGICAL HISTORY:  - Trigger finger surgery last year  - Open MRI showing possible multiple myeloma, followed by closed MRI revealing hemangioma in spine    FAMILY HISTORY  The patient mentions that her family is sensitive to medications.        Current Medications[1]   Surgical History[2]   Medical History[3]  Social History[4]   Family History[5]   Review of Systems    Objective   /62   Pulse 60   Ht 1.575 m (5' 2\")   Wt 58.1 kg (128 lb)   SpO2 98%   BMI 23.41 kg/m²    Physical Exam  Vitals and nursing note reviewed.   Constitutional:       General: She is not in acute distress.     Appearance: Normal appearance. She is not ill-appearing.   HENT:      Head: Normocephalic and atraumatic.      Right Ear: Tympanic membrane, ear canal and external ear normal.      Left Ear: Tympanic membrane, ear canal and external ear normal.      Nose: Nose normal. No congestion or rhinorrhea.      Mouth/Throat:      Mouth: Mucous " membranes are moist.      Pharynx: Oropharynx is clear. No oropharyngeal exudate or posterior oropharyngeal erythema.     Eyes:      General:         Right eye: No discharge.         Left eye: No discharge.      Extraocular Movements: Extraocular movements intact.      Conjunctiva/sclera: Conjunctivae normal.      Pupils: Pupils are equal, round, and reactive to light.     Neck:      Vascular: No carotid bruit.     Cardiovascular:      Rate and Rhythm: Normal rate and regular rhythm.      Pulses: Normal pulses.      Heart sounds: Normal heart sounds.   Pulmonary:      Effort: Pulmonary effort is normal.      Breath sounds: Normal breath sounds. No wheezing, rhonchi or rales.   Chest:      Chest wall: No tenderness.     Musculoskeletal:      Cervical back: Normal range of motion and neck supple.      Comments: Pain with resisted supination and extension of left wrist   Lymphadenopathy:      Cervical: No cervical adenopathy.     Skin:     Capillary Refill: Capillary refill takes less than 2 seconds.     Neurological:      General: No focal deficit present.      Mental Status: She is alert and oriented to person, place, and time.     Psychiatric:         Mood and Affect: Mood is anxious.         Behavior: Behavior normal.           Assessment/Plan   Problem List Items Addressed This Visit    None  Visit Diagnoses         Hoarseness of voice    -  Primary    Relevant Medications    methylPREDNISolone (Medrol Dospak) 4 mg tablets    Other Relevant Orders    Referral to ENT            Assessment & Plan  1. Hoarseness.  - Symptoms include hoarseness that comes and goes, with associated postnasal drip and heartburn.  - Physical examination reveals no abnormalities in the throat; CT scan last month showed no signs of lung cancer.  - Discussed the likelihood of hoarseness being caused by postnasal drip or heartburn rather than ALS or lung cancer.  - Prescribed Claritin or Allegra for postnasal drip and recommended regular use  of Nexium to alleviate hoarseness; referral to ENT specialist for further evaluation.    2. Tennis elbow.  - Symptoms include excruciating pain in the wrist and hands, particularly at night, and pain upon certain movements.  - Physical examination confirms pain with resistance movements consistent with tennis elbow.  - Discussed the possibility of pain being a residual effect of statin use, which typically subsides within 5 days.  - Recommended avoiding repetitive activities that exacerbate symptoms.    3. Acid reflux.  - Symptoms include acid reflux managed with Nexium and occasional use of Prevacid.  - Patient reports significant improvement with Nexium use.  - Discussed the importance of regular use of Nexium to manage acid reflux symptoms.  - Recommended continuing Nexium regularly to prevent heartburn and associated hoarseness.       Patient understands and agrees with treatment plan    This medical note was created with the assistance of artificial intelligence (AI) for documentation purposes. The content has been reviewed and confirmed by the healthcare provider for accuracy and completeness. Patient consented to the use of audio recording and use of AI during their visit.     Bandar Woodard DO          [1]   Current Outpatient Medications:     calcium carbonate-vitamin D3 600 mg-5 mcg (200 unit) tablet, Take 1 tablet by mouth twice a day., Disp: , Rfl:     ibuprofen 200 mg tablet, Take by mouth., Disp: , Rfl:     multivitamin tablet, Take 1 tablet by mouth once daily., Disp: , Rfl:     methylPREDNISolone (Medrol Dospak) 4 mg tablets, Take as directed on package., Disp: 21 tablet, Rfl: 0  [2]   Past Surgical History:  Procedure Laterality Date    BREAST LUMPECTOMY  2013    Right Breast Lumpectomy     SECTION, CLASSIC  2013     Section    COLONOSCOPY  2014    Complete Colonoscopy    OTHER SURGICAL HISTORY  2013    Division Of Plantar Fascia And Muscle Left Foot     OTHER SURGICAL HISTORY  08/07/2013    Division Of Plantar Fascia And Muscle Right Foot    OTHER SURGICAL HISTORY  02/03/2021    Breast biopsy excisional    RHINOPLASTY  08/07/2013    Rhinoplasty   [3]   Past Medical History:  Diagnosis Date    Chest pain 07/06/2023    Diarrhea, unspecified 12/30/2016    Acute diarrhea    Fatigue 07/06/2023    Laceration without foreign body of left thumb without damage to nail, initial encounter 07/06/2016    Laceration of left thumb, initial encounter    Neoplasm of uncertain behavior 07/13/2012    Other acute sinusitis 07/06/2023    Other injury of unspecified body region, initial encounter 06/16/2016    Puncture wound    Personal history of other diseases of the respiratory system 04/14/2014    Personal history of acute sinusitis    Personal history of other diseases of the respiratory system 08/06/2014    Personal history of acute sinusitis    Personal history of other diseases of the respiratory system 05/30/2017    History of acute sinusitis    Personal history of other diseases of the respiratory system 06/18/2015    History of acute sinusitis    Unspecified lump in the left breast, unspecified quadrant 07/06/2023   [4]   Social History  Tobacco Use    Smoking status: Never    Smokeless tobacco: Never   Substance Use Topics    Alcohol use: Not Currently    Drug use: Never   [5] No family history on file.

## 2025-07-30 ENCOUNTER — APPOINTMENT (OUTPATIENT)
Dept: DERMATOLOGY | Facility: CLINIC | Age: 65
End: 2025-07-30
Payer: MEDICARE

## 2025-07-30 DIAGNOSIS — D18.01 ANGIOMA OF SKIN: ICD-10-CM

## 2025-07-30 DIAGNOSIS — Z12.83 ENCOUNTER FOR SCREENING FOR MALIGNANT NEOPLASM OF SKIN: Primary | ICD-10-CM

## 2025-07-30 DIAGNOSIS — L82.1 SEBORRHEIC KERATOSIS: ICD-10-CM

## 2025-07-30 DIAGNOSIS — L57.0 ACTINIC KERATOSIS: ICD-10-CM

## 2025-07-30 DIAGNOSIS — D22.9 BENIGN NEVUS: ICD-10-CM

## 2025-07-30 DIAGNOSIS — L81.4 LENTIGO: ICD-10-CM

## 2025-07-30 DIAGNOSIS — L57.9 SKIN CHANGES DUE TO CHRONIC EXPOSURE TO NONIONIZING RADIATION: ICD-10-CM

## 2025-07-30 PROCEDURE — 17000 DESTRUCT PREMALG LESION: CPT | Performed by: NURSE PRACTITIONER

## 2025-07-30 PROCEDURE — 1159F MED LIST DOCD IN RCRD: CPT | Performed by: NURSE PRACTITIONER

## 2025-07-30 PROCEDURE — 1036F TOBACCO NON-USER: CPT | Performed by: NURSE PRACTITIONER

## 2025-07-30 PROCEDURE — 1160F RVW MEDS BY RX/DR IN RCRD: CPT | Performed by: NURSE PRACTITIONER

## 2025-07-30 PROCEDURE — 99203 OFFICE O/P NEW LOW 30 MIN: CPT | Performed by: NURSE PRACTITIONER

## 2025-07-30 NOTE — PATIENT INSTRUCTIONS

## 2025-07-30 NOTE — PROGRESS NOTES
Subjective     Cassandra Fraga is a 64 y.o. female who presents for the following: Skin Check.     Review of Systems:  No other skin or systemic complaints other than what is documented elsewhere in the note.    The following portions of the chart were reviewed this encounter and updated as appropriate:   Tobacco  Allergies  Meds  Problems  Med Hx  Surg Hx         Skin Cancer History  Biopsy Log Book  No skin cancers from Specimen Tracking.    Additional History      Specialty Problems    None       Objective   Well appearing patient in no apparent distress; mood and affect are within normal limits.    A full examination was performed including scalp, head, eyes, ears, nose, lips, neck, chest, axillae, abdomen, back, buttocks, bilateral upper extremities, bilateral lower extremities, hands, feet, fingers, toes, fingernails, and toenails. All findings within normal limits unless otherwise noted below.      Assessment/Plan   Skin Exam  1. ENCOUNTER FOR SCREENING FOR MALIGNANT NEOPLASM OF SKIN  Generalized  This Visit  - Follow Up In Dermatology - Established Patient  2. ANGIOMA OF SKIN  Generalized  Scattered cherry-red papule(s).  A cherry hemangioma is a small macule (small, flat, smooth area) or papule (small, solid bump) formed from an overgrowth of tiny blood vessels in the skin. Cherry hemangiomas are characteristically red or purplish in color. They often first appear in middle adulthood and usually increase in number with age. Cherry hemangiomas are noncancerous (benign) and are common in adults.    The present appearance of the lesion is not worrisome but it should continue to be observed and testing/treatment may be warranted if change occurs.  This Visit  - Follow Up In Dermatology - Established Patient  3. BENIGN NEVUS  Generalized  Scattered, uniform and benign-appearing, regular brown melanocytic papules and macules.  The present appearance of the lesion is not worrisome but it should continue to  be observed and testing/treatment may be warranted if change occurs.  This Visit  - Follow Up In Dermatology - Established Patient  4. SEBORRHEIC KERATOSIS  Generalized  Stuck on verrucous, tan-brown papules and plaques.    Seborrheic keratoses are common noncancerous (benign) growths of unknown cause seen in adults due to a thickening of an area of the top skin layer. Seborrheic keratoses may appear as if they are stuck on to the skin. They have distinct borders, and they may appear as papules (small, solid bumps) or plaques (solid, raised patches that are bigger than a thumbnail). They may be the same color as your skin, or they may be pink, light brown, darker brown, or very dark brown, or sometimes may appear black.    There is no way to prevent new seborrheic keratoses from forming. Seborrheic keratoses can be removed, but removal is considered a cosmetic issue and is usually not covered by insurance.    PLAN  No treatment is needed unless there is irritation from clothing, such as itching or bleeding.  2.   Some lotions containing alpha hydroxy acids, salicylic acid, or urea may make the areas feel smoother with regular use but will not eliminate them.  This Visit  - Follow Up In Dermatology - Established Patient  5. LENTIGO  Generalized  Scattered tan macules in sun-exposed areas.  A solar lentigo (plural, solar lentigines), also known as a sun-induced freckle or senile lentigo, is a dark (hyperpigmented) lesion caused by natural or artificial ultraviolet (UV) light. Solar lentigines may be single or multiple. This type of lentigo is different from a simple lentigo (lentigo simplex) because it is caused by exposure to UV light. Solar lentigines are benign, but they do indicate excessive sun exposure, a risk factor for the development of skin cancer.    To prevent solar lentigines, avoid exposure to sunlight in midday (10 AM to 3 PM), wear sun-protective clothing (tightly woven clothes and hats), and apply  sunscreen (SPF 30 UVA and UVB block).    The present appearance of the lesion is not worrisome but it should continue to be observed and testing/treatment may be warranted if change occurs.  This Visit  - Follow Up In Dermatology - Established Patient  6. SKIN CHANGES DUE TO CHRONIC EXPOSURE TO NONIONIZING RADIATION  Generalized  Actinic changes in the form of freckles, lentigines and hyper/hypopigmentation   ABCDEs of melanoma and atypical moles were discussed with the patient.    Patient was instructed to perform monthly self skin examination.  We recommended that the patient have regular full skin exams given an increased risk of subsequent skin cancers.    The patient was instructed to use sun protective behaviors including use of broad spectrum sunscreens and sun protective clothing to reduce risk of skin cancers.    Warning signs of non-melanoma skin cancer discussed.  This Visit  - Follow Up In Dermatology - Established Patient  7. ACTINIC KERATOSIS  Right Nasal Sidewall  Thin erythematous papules with gritty scale  WHAT IS ACTINIC KERATOSIS?   - Actinic keratosis (AK) is a skin condition caused by sun damage. It causes scaly, rough, or bumpy spots on the skin.  - If left alone, AKs may turn into a skin cancer. People who burn easily or have trouble tanning are at more risk for developing AKs.   - There is no one test for AKs and diagnosis is made by clinical appearance. Treatment options include cryotherapy, therapy with lights, and various creams (e.g., topical 5-fluorocuracil, imiquimod).       To lower the chance of getting AK, you can:       ?  Stay out of the sun in the middle of the day (from 10 a.m. to 4 p.m.)       ?  Wear sunscreen - An SPF of at least 30 is best. The SPF number is on the sunscreen bottle or tube.       ?  Wear a wide-brimmed hat, long-sleeved shirt, long pants, or long skirt outside. A baseball hat does not give much protection.        ?  Do not use tanning beds.        ?  Keep a  low-fat diet, less than 21% of calories should come from fat       ?  Take Vitamin B3 (nicotinomide) 500mg twice daily.      YOUR TREATMENT PLAN  - At this time I recommend treatment with cryotherapy.  - Possible side effects of liquid nitrogen treatment reviewed including formation of blisters, crusting, tenderness, scar, and discoloration which may be permanent.  - Patient advised to return the office for re-evaluation if the treated lesion(s) do not resolve within 4-6 weeks. Patient verbalizes understanding.  - Destr of lesion - Right Nasal Sidewall  Complexity: simple    Destruction method: cryotherapy    Informed consent: discussed and consent obtained    Timeout:  patient name, date of birth, surgical site, and procedure verified  Lesion destroyed using liquid nitrogen: Yes    Cryotherapy cycles:  2  Outcome: patient tolerated procedure well with no complications    Post-procedure details: wound care instructions given      This Visit  - Follow Up In Dermatology - Established Patient  Return to clinic in 1 year for skin check/follow up or sooner if needed

## 2025-07-30 NOTE — Clinical Note
ABCDEs of melanoma and atypical moles were discussed with the patient.    Patient was instructed to perform monthly self skin examination.  We recommended that the patient have regular full skin exams given an increased risk of subsequent skin cancers.    The patient was instructed to use sun protective behaviors including use of broad spectrum sunscreens and sun protective clothing to reduce risk of skin cancers.    Warning signs of non-melanoma skin cancer discussed.   No

## 2025-10-22 ENCOUNTER — APPOINTMENT (OUTPATIENT)
Dept: OTOLARYNGOLOGY | Facility: CLINIC | Age: 65
End: 2025-10-22
Payer: MEDICARE